# Patient Record
(demographics unavailable — no encounter records)

---

## 2024-12-02 NOTE — PHYSICAL EXAM

## 2024-12-02 NOTE — HISTORY OF PRESENT ILLNESS
[Mother] : mother [Yes] : Patient goes to dentist yearly [Up to date] : Up to date [Normal] : normal [Eats meals with family] : eats meals with family [Has family members/adults to turn to for help] : has family members/adults to turn to for help [Is permitted and is able to make independent decisions] : Is permitted and is able to make independent decisions [Sleep Concerns] : no sleep concerns [Normal Performance] : normal performance [Normal Behavior/Attention] : normal behavior/attention [Normal Homework] : normal homework [Eats regular meals including adequate fruits and vegetables] : eats regular meals including adequate fruits and vegetables [Drinks non-sweetened liquids] : drinks non-sweetened liquids  [Calcium source] : calcium source [Has concerns about body or appearance] : does not have concerns about body or appearance [Has friends] : has friends [At least 1 hour of physical activity a day] : at least 1 hour of physical activity a day [Screen time (except homework) less than 2 hours a day] : screen time (except homework) less than 2 hours a day [Has interests/participates in community activities/volunteers] : has interests/participates in community activities/volunteers. [Uses electronic nicotine delivery system] : does not use electronic nicotine delivery system [Exposure to electronic nicotine delivery system] : no exposure to electronic nicotine delivery system [Uses tobacco] : does not use tobacco [Exposure to tobacco] : no exposure to tobacco [Uses drugs] : does not use drugs  [Drinks alcohol] : does not drink alcohol [Exposure to alcohol] : no exposure to alcohol [Uses safety belts/safety equipment] : uses safety belts/safety equipment  [Impaired/distracted driving] : no impaired/distracted driving [Has peer relationships free of violence] : has peer relationships free of violence [No] : Patient has not had sexual intercourse. [HIV Screening Declined] : HIV Screening Declined [Has ways to cope with stress] : has ways to cope with stress [Displays self-confidence] : displays self-confidence [Has problems with sleep] : does not have problems with sleep [Gets depressed, anxious, or irritable/has mood swings] : does not get depressed, anxious, or irritable/has mood swings [Has thought about hurting self or considered suicide] : has not thought about hurting self or considered suicide [With Teen] : teen [NO] : No [FreeTextEntry1] : 17 years old well visit

## 2025-03-18 NOTE — HISTORY OF PRESENT ILLNESS
[FreeTextEntry1] : Pediatric & Adolescent Gynecology: Return Patient Visit   KITTY is a(n) 17-year-old female ( 2007) presenting for new visit in for dysmenorrhea.    Referred by: RFP: MD NEHEMIAH GASTELUM  Review of history:  Seen by PCP 11/15/23 for menstrual cramps  C/o bad menstrual cramps x 1 day, got severe last night, vomited x1, she has been taking Motrin 400 mg q4hrs She had an adolescent GYN appointment 2 weeks ago but she cancelled it (10/30/23) Seen by GI 3/2023 for abdominal bloating/pain. Her symptoms resolved so mom says they did not do GI work up that was ordered. Mom requested pelvic US (done 11/15/23- normal)   Initial visit 24: here today with mom  KITTY says her last few periods have not been too bad  She said Sep/Oct/Nov her periods were terrible  Mom said she throws up, misses school   She has pain with each period  became painful around Dec 2022  Kitty notes that some periods are awful / unbearable -- some are not as bad has had to miss school just a few times    Menstrual History: Menarche age 12 years old (that summer, shortly after turning 12 ) Cycles: regular, monthly  Duration of periods: 4 days  Period products: Pads and tampons (regular)  Flow: Only heavy day 2  - Changes her pad/tampon every 3 hours (not usually fully soaked)  Cramps: Cramps start a few days before bleeding starts  - Last throughout her period  - takes Acetaminophen, Motrin 400 mg alternating (q4h)  LMP: 24 Prior:  Dec 14, 2023  Nov (not recorded)  Oct 13 Sept 13  I asked about NSAID use in the past and if she has had GI issues (as PCP's note had mentioned risk of bleeding & ulcers)  - she had severe pain, took a lot of pain meds, then had vomiting despite not having much in her stomach  - so they were not sure if it was because of NSAID use? or the severity of her pain?   Other medical:  1) h/o AVNRT s/p ablation in 2022; last f/u with [facility]eds Cards was 2023 - has been doing well overall  - recently has been having palpitations (with normal HR) - feels irregular  - when she was getting SVTs, that was different - felt very rapid ( out of nowhere) - this is a different sensation  2) migraine headaches  KITTY reports she gets migraines more often recently  - a few times a week this month; prior to this, it was much less frequent, maybe once a month - can feel it starting, with milder pain, then progresses - they just got migraine medicine (Tylenol migraine) - before that, it would last the whole day, and regular pain med wouldn't help  - occur even if eating & sleeping fine  - pain is mainly in the back of head towards her neck, or can be in the front/center of forehead - will start off w/ mild pain and then gradually increases  - sensitive to light & sound, and sometimes nausea  - She usually takes Tylenol migraine (PM Tylenol migraine at night), if she does not take this, she says it will last up to 6 hours or more   - is not really sure if she has any preceding symptoms  - she thinks she has an aura sometimes - reports that her vision will look blurry prior to the migraine - things look 'wavy' sometimes   There's a lot going on -  mary year, cheer, midterm exams, anxious, not sleeping well   Surgeries:  - had adenoids & ear tubes in 2012 (age 5)  - dental procedure (had to expose a baby tooth) age 12/13  - catheter ablation in 2022   Bleeding history:  -No personal history of frequent/prolonged nosebleeds, easy bruising, excessive bleeding from injury/surgery.  -No known family history of bleeding disorders.  Estrogen contraindications:  -No personal history of DVT/PE, clotting disorder, hypertension, diabetes, cardiovascular/renal/liver disease, or malignancy.  -No immediate family history of DVT, PE, or clotting disorder.  - Paternal grandmother - HTN, diabetes  Social / Confidential history: obtained 2024 Lives with: mom, dad, grandpa & grandma here for now (paternal)  - dad is from [facility], mom is from [facility] - had a brother but he passed away - feels safe - no h/o abuse (just getting disciplined as a kid!)  School: 11th grade - after HS: interested in working for the [employer]; also likes art  Activities/Hobbies: - cheer, art  Mood: usually fine  - last month has been really hard - had a loss in the family & she was closest to that person  - also the loss of her brother in 2019  - deals with anxiety in general - went to a therapist for a while; hasn't had an appt in a while; doesn't enjoy it  Dating/Relationships: - current: none - past: yes, but nothing too serious  Interested in [boys/girls/both/not sure]: - boys Sexual activity: no - pre-sexual activity: yes kissing/touching - no oral sex, no P-V sex  Gender/Pronouns: - female   TODAY 25: here today with

## 2025-03-18 NOTE — PLAN
[FreeTextEntry1] :  Thank you for seeing us today!   Labs: - Please have blood work performed. *This can be done at any time (you do not need to be fasting).  - It can take 2-3 weeks for all results to be received and reviewed. We will contact you with any results that need to be discussed before your next appointment. - [facility] lab locations: [facility]/labs   Pain medication: - For menstrual cramps: we recommend taking an NSAID (ibuprofen or naproxen). Start taking medication when the cramps are still mild, take it on a schedule for about 48 hours, then switch to taking only as needed. - Recommended dose: ibuprofen 600 mg every 6 hours or 800 mg every 8 hours, *or* naproxen 375 mg every 8 hours or 500-550 mg every 8 to 12 hours. - You can alternate with Midol or Tylenol if desired  Hormonal medication:  - Please let us know if you would like to start any of the medications we discussed or would like more information.   Tracking and "period rules": - Track menstrual periods & symptoms on a calendar or phone mary (such as [employer], [employer]) - Please contact us if you have: --> very heavy bleeding (soaking a pad/tampon in 1-2 hours for 3-4 hours) --> bleeding for >10 days --> severe pain with periods that is not relieved by pain medication --> periods are occurring too frequently (<21 days apart) or infrequently (>60 days apart)  - Follow up with Ophthalmology and with Neurology as planned    - Please schedule your next appointment with Dr. Merino in about 6 months (in office or telehealth).   To contact the Pediatric & Adolescent Gynecology team: - phone: [phone] -- option 2 for call center (will schedule follow-up or direct your call), or option 8 then 4 to leave message for Dr. Merino's  - patient portal (available for ages <13 or 18+) - email: [email or web address] (for non-urgent requests/records)   Appointment locations: - Mondays and Thursdays: [address], [address], [facility] [address] ([facility]) - Wednesdays: [address], [address], [facility] [address] (on Google Maps: [facility] [place])

## 2025-03-18 NOTE — ASSESSMENT
[FreeTextEntry1] : We had a detailed discussion on the following issues: - Evaluation of menstrual cramps, the most common causes (primary dysmenorrhea, endometriosis, uterine anomaly), diagnosis and management of each - Treatment options for menstrual symptoms including hormonal and non-hormonal medications, scheduled NSAIDs - Importance of tracking symptoms in order to assess treatment response and make adjustments  given her headaches (?migraines w/ ?aura)  recommend avoiding estrogen-containing methods for now

## 2025-07-15 NOTE — PLAN
[FreeTextEntry1] :  Thank you for seeing us today!   Labs: - Please have blood work performed. *This can be done at any time (you do not need to be fasting).  - It can take 2-3 weeks for all results to be received and reviewed. We will contact you with any results that need to be discussed before your next appointment. - [facility] lab locations: [facility]/labs   Pain medication: - For menstrual cramps: we recommend taking an NSAID (ibuprofen or naproxen). Start taking medication when the cramps are still mild, take it on a schedule for about 48 hours, then switch to taking only as needed. - Recommended dose: ibuprofen 600 mg every 6 hours or 800 mg every 8 hours, *or* naproxen 375 mg every 8 hours or 500-550 mg every 8 to 12 hours. - You can alternate with Midol or Tylenol if desired  Hormonal medication:  - Please let us know if you would like to start any of the medications we discussed or would like more information.   Tracking and "period rules": - Track menstrual periods & symptoms on a calendar or phone mary (such as [employer], [employer]) - Please contact us if you have: --> very heavy bleeding (soaking a pad/tampon in 1-2 hours for 3-4 hours) --> bleeding for >10 days --> severe pain with periods that is not relieved by pain medication --> periods are occurring too frequently (<21 days apart) or infrequently (>60 days apart)  - Follow up with Ophthalmology and with Neurology as planned    - Please schedule your next appointment with Dr. Merino in about 6 months (in office or telehealth).   To contact the Pediatric & Adolescent Gynecology team: - phone: [phone] -- option 2 for call center (will schedule follow-up or direct your call), or option 8 then 4 to leave message for Dr. Merino's  - patient portal (available for ages <13 or 18+) - email: [email or web address] (for non-urgent requests/records)   Appointment locations: - Mondays and Thursdays: [address], [address], [facility] [address] ([facility]) - Wednesdays: [address], [address], [facility] [address] (on Google Maps: [facility] [place])

## 2025-07-15 NOTE — HISTORY OF PRESENT ILLNESS
[FreeTextEntry1] : Pediatric & Adolescent Gynecology: Return Patient Visit   KITTY is a(n) 18-year-old female ( 2007) presenting for new visit in for dysmenorrhea.    Referred by: RFP: MD NEHEMIAH GASTELUM  Review of history:  Seen by PCP 11/15/23 for menstrual cramps  C/o bad menstrual cramps x 1 day, got severe last night, vomited x1, she has been taking Motrin 400 mg q4hrs She had an adolescent GYN appointment 2 weeks ago but she cancelled it (10/30/23) Seen by GI 3/2023 for abdominal bloating/pain. Her symptoms resolved so mom says they did not do GI work up that was ordered. Mom requested pelvic US (done 11/15/23- normal)   Initial visit 24: here today with mom  KITTY says her last few periods have not been too bad  She said Sep/Oct/Nov her periods were terrible  Mom said she throws up, misses school   She has pain with each period  became painful around Dec 2022  Kitty notes that some periods are awful / unbearable -- some are not as bad has had to miss school just a few times    Menstrual History: Menarche age 12 years old (that summer, shortly after turning 12 ) Cycles: regular, monthly  Duration of periods: 4 days  Period products: Pads and tampons (regular)  Flow: Only heavy day 2  - Changes her pad/tampon every 3 hours (not usually fully soaked)  Cramps: Cramps start a few days before bleeding starts  - Last throughout her period  - takes Acetaminophen, Motrin 400 mg alternating (q4h)  LMP: 24 Prior:  Dec 14, 2023  Nov (not recorded)  Oct 13 Sept 13  I asked about NSAID use in the past and if she has had GI issues (as PCP's note had mentioned risk of bleeding & ulcers)  - she had severe pain, took a lot of pain meds, then had vomiting despite not having much in her stomach  - so they were not sure if it was because of NSAID use? or the severity of her pain?   Other medical:  1) h/o AVNRT s/p ablation in 2022; last f/u with [facility]eds Cards was 2023 - has been doing well overall  - recently has been having palpitations (with normal HR) - feels irregular  - when she was getting SVTs, that was different - felt very rapid ( out of nowhere) - this is a different sensation  2) migraine headaches  KITTY reports she gets migraines more often recently  - a few times a week this month; prior to this, it was much less frequent, maybe once a month - can feel it starting, with milder pain, then progresses - they just got migraine medicine (Tylenol migraine) - before that, it would last the whole day, and regular pain med wouldn't help  - occur even if eating & sleeping fine  - pain is mainly in the back of head towards her neck, or can be in the front/center of forehead - will start off w/ mild pain and then gradually increases  - sensitive to light & sound, and sometimes nausea  - She usually takes Tylenol migraine (PM Tylenol migraine at night), if she does not take this, she says it will last up to 6 hours or more   - is not really sure if she has any preceding symptoms  - she thinks she has an aura sometimes - reports that her vision will look blurry prior to the migraine - things look 'wavy' sometimes   There's a lot going on -  mary year, cheer, midterm exams, anxious, not sleeping well   Surgeries:  - had adenoids & ear tubes in 2012 (age 5)  - dental procedure (had to expose a baby tooth) age 12/13  - catheter ablation in 2022   Bleeding history:  -No personal history of frequent/prolonged nosebleeds, easy bruising, excessive bleeding from injury/surgery.  -No known family history of bleeding disorders.  Estrogen contraindications:  -No personal history of DVT/PE, clotting disorder, hypertension, diabetes, cardiovascular/renal/liver disease, or malignancy.  -No immediate family history of DVT, PE, or clotting disorder.  - Paternal grandmother - HTN, diabetes  Social / Confidential history: obtained 2024 Lives with: mom, dad, grandpa & grandma here for now (paternal)  - dad is from [facility], mom is from [place] [facility] [facility] - had a brother but he passed away - feels safe - no h/o abuse (just getting disciplined as a kid!)  School: 11th grade - after HS: interested in working for the [employer]; also likes art  Activities/Hobbies: - cheer, art  Mood: usually fine  - last month has been really hard - had a loss in the family & she was closest to that person  - also the loss of her brother in 2019  - deals with anxiety in general - went to a therapist for a while; hasn't had an appt in a while; doesn't enjoy it  Dating/Relationships: - current: none - past: yes, but nothing too serious  Interested in [boys/girls/both/not sure]: - boys Sexual activity: no - pre-sexual activity: yes kissing/touching - no oral sex, no P-V sex  Gender/Pronouns: - female   07/15/24: CAN   25: CAN  TODAY 25: here today with